# Patient Record
Sex: MALE | Race: WHITE | NOT HISPANIC OR LATINO | URBAN - METROPOLITAN AREA
[De-identification: names, ages, dates, MRNs, and addresses within clinical notes are randomized per-mention and may not be internally consistent; named-entity substitution may affect disease eponyms.]

---

## 2019-12-24 ENCOUNTER — WALK IN (OUTPATIENT)
Dept: URGENT CARE | Age: 74
End: 2019-12-24

## 2019-12-24 VITALS
TEMPERATURE: 97.9 F | SYSTOLIC BLOOD PRESSURE: 140 MMHG | RESPIRATION RATE: 16 BRPM | DIASTOLIC BLOOD PRESSURE: 70 MMHG | WEIGHT: 175 LBS | HEIGHT: 69 IN | HEART RATE: 70 BPM | BODY MASS INDEX: 25.92 KG/M2

## 2019-12-24 DIAGNOSIS — J01.90 ACUTE NON-RECURRENT SINUSITIS, UNSPECIFIED LOCATION: ICD-10-CM

## 2019-12-24 DIAGNOSIS — J01.90 ACUTE NON-RECURRENT SINUSITIS, UNSPECIFIED LOCATION: Primary | ICD-10-CM

## 2019-12-24 DIAGNOSIS — J06.9 VIRAL URI WITH COUGH: ICD-10-CM

## 2019-12-24 LAB
INTERNAL PROCEDURAL CONTROLS ACCEPTABLE: YES
S PYO AG THROAT QL IA.RAPID: NEGATIVE

## 2019-12-24 PROCEDURE — 87880 STREP A ASSAY W/OPTIC: CPT | Performed by: NURSE PRACTITIONER

## 2019-12-24 PROCEDURE — 99203 OFFICE O/P NEW LOW 30 MIN: CPT | Performed by: NURSE PRACTITIONER

## 2019-12-24 RX ORDER — AMOXICILLIN 875 MG/1
875 TABLET, COATED ORAL 2 TIMES DAILY
Qty: 20 TABLET | Refills: 0 | Status: SHIPPED | OUTPATIENT
Start: 2019-12-24 | End: 2020-01-03

## 2019-12-24 RX ORDER — FLUTICASONE PROPIONATE 50 MCG
2 SPRAY, SUSPENSION (ML) NASAL DAILY
Qty: 16 G | Refills: 1 | Status: SHIPPED | OUTPATIENT
Start: 2019-12-24 | End: 2019-12-31

## 2019-12-24 RX ORDER — FLECAINIDE ACETATE 100 MG/1
TABLET ORAL
Refills: 3 | COMMUNITY
Start: 2019-10-21

## 2019-12-24 RX ORDER — PRAVASTATIN SODIUM 20 MG
TABLET ORAL
Refills: 2 | COMMUNITY
Start: 2019-11-27

## 2019-12-24 RX ORDER — CELECOXIB 200 MG/1
CAPSULE ORAL
Refills: 10 | COMMUNITY
Start: 2019-11-29

## 2019-12-26 RX ORDER — FLUTICASONE PROPIONATE 50 MCG
SPRAY, SUSPENSION (ML) NASAL
Qty: 48 G | Refills: 0 | OUTPATIENT
Start: 2019-12-26

## 2019-12-26 ASSESSMENT — ENCOUNTER SYMPTOMS
FEVER: 0
DIAPHORESIS: 0
DIZZINESS: 0
SLEEP DISTURBANCE: 0
NAUSEA: 0
SINUS PRESSURE: 1
CHEST TIGHTNESS: 0
LIGHT-HEADEDNESS: 0
EYE REDNESS: 0
SORE THROAT: 1
HEADACHES: 1
ABDOMINAL PAIN: 0
CONSTITUTIONAL NEGATIVE: 1
SHORTNESS OF BREATH: 0
WHEEZING: 0
CHILLS: 0
SINUS PAIN: 0
VOMITING: 0
COUGH: 1

## 2021-03-20 ENCOUNTER — HOSPITAL ENCOUNTER (EMERGENCY)
Age: 76
End: 2021-03-20

## 2024-12-25 ENCOUNTER — HOSPITAL ENCOUNTER (OUTPATIENT)
Age: 79
Discharge: HOME | End: 2024-12-25
Payer: COMMERCIAL

## 2024-12-25 VITALS
RESPIRATION RATE: 18 BRPM | DIASTOLIC BLOOD PRESSURE: 65 MMHG | HEART RATE: 69 BPM | OXYGEN SATURATION: 100 % | SYSTOLIC BLOOD PRESSURE: 152 MMHG | TEMPERATURE: 98 F

## 2024-12-25 DIAGNOSIS — J10.1 INFLUENZA A: Primary | ICD-10-CM

## 2024-12-25 DIAGNOSIS — R53.83 FATIGUE: ICD-10-CM

## 2024-12-25 DIAGNOSIS — R05.1 ACUTE COUGH: ICD-10-CM

## 2024-12-25 LAB
POCT INFLUENZA A: POSITIVE
POCT INFLUENZA B: NEGATIVE
SARS-COV-2 RNA RESP QL NAA+PROBE: NOT DETECTED

## 2024-12-25 PROCEDURE — 99204 OFFICE O/P NEW MOD 45 MIN: CPT | Performed by: PHYSICIAN ASSISTANT

## 2024-12-25 PROCEDURE — U0002 COVID-19 LAB TEST NON-CDC: HCPCS | Performed by: PHYSICIAN ASSISTANT

## 2024-12-25 PROCEDURE — 87502 INFLUENZA DNA AMP PROBE: CPT | Performed by: PHYSICIAN ASSISTANT

## 2024-12-25 RX ORDER — PRAVASTATIN SODIUM 20 MG
1 TABLET ORAL DAILY
COMMUNITY
Start: 2023-09-15

## 2024-12-25 RX ORDER — BENZONATATE 100 MG/1
100 CAPSULE ORAL 3 TIMES DAILY PRN
Qty: 30 CAPSULE | Refills: 0 | Status: SHIPPED | OUTPATIENT
Start: 2024-12-25 | End: 2025-01-24

## 2024-12-25 RX ORDER — FLECAINIDE ACETATE 100 MG/1
TABLET ORAL
COMMUNITY
Start: 2023-10-19

## 2024-12-25 RX ORDER — ALBUTEROL SULFATE 90 UG/1
2 INHALANT RESPIRATORY (INHALATION) EVERY 4 HOURS PRN
Qty: 1 EACH | Refills: 0 | Status: SHIPPED | OUTPATIENT
Start: 2024-12-25

## 2024-12-25 NOTE — ED PROVIDER NOTES
Patient Seen in: [facility]      History   No chief complaint on file.    Stated Complaint: Throat issue    Subjective:   HPI    Patient is a 79-year-old  male with atrial fibrillation on Eliquis, hyperlipidemia, COVID by daughter, presenting to immediate care for evaluation of cold-like symptoms.  Onset: 3 days.  Patient experiencing generalized fatigue, malaise, nasal congestion, postnasal drip, sore throat, cough and congestion.  No fevers.  No chest pain or shortness of breath.  No weakness or confusion.  Not immunocompromise.  No known sick contacts.  Recently traveling to visit family.  Originally from [place].      Objective:     Past Medical History:    Atrial fibrillation (HCC)    Hyperlipidemia              Past Surgical History:   Procedure Laterality Date    Excis lumbar disk,one level      Hip replacement surgery Bilateral                 Social History     Socioeconomic History    Marital status:    Tobacco Use    Smoking status: Never     Passive exposure: Never    Smokeless tobacco: Never   Vaping Use    Vaping status: Never Used              Review of Systems   Constitutional:  Negative for fever.   HENT:  Positive for congestion and sore throat.    Respiratory:  Positive for cough. Negative for shortness of breath.    Cardiovascular:  Negative for chest pain.   Gastrointestinal:  Negative for abdominal pain, diarrhea and vomiting.   Musculoskeletal:  Negative for neck stiffness.   Skin:  Negative for rash.   Neurological:  Negative for dizziness, weakness and light-headedness.   Psychiatric/Behavioral:  Negative for confusion.    All other systems reviewed and are negative.      Positive for stated complaint: Throat issue  Other systems are as noted in HPI.  Constitutional and vital signs reviewed.      All other systems reviewed and negative except as noted above.    Physical Exam     ED Triage Vitals [12/25/24 1325]   /65   Pulse 69   Resp 18   Temp 97.5 °F (36.4  °C)   Temp src Oral   SpO2 100 %   O2 Device None (Room air)       Current Vitals:   Vital Signs  BP: 152/65  Pulse: 69  Resp: 18  Temp: 97.5 °F (36.4 °C)  Temp src: Oral    Oxygen Therapy  SpO2: 100 %  O2 Device: None (Room air)        Physical Exam  Vitals and nursing note reviewed.   Constitutional:       General: He is not in acute distress.     Appearance: Normal appearance. He is not ill-appearing, toxic-appearing or diaphoretic.   HENT:      Head: Normocephalic and atraumatic.      Right Ear: Tympanic membrane normal.      Left Ear: Tympanic membrane normal.      Nose: Congestion present. No rhinorrhea.      Mouth/Throat:      Mouth: Mucous membranes are moist.      Pharynx: No oropharyngeal exudate or posterior oropharyngeal erythema.   Eyes:      Conjunctiva/sclera: Conjunctivae normal.   Cardiovascular:      Rate and Rhythm: Normal rate and regular rhythm.      Pulses: Normal pulses.   Pulmonary:      Effort: Pulmonary effort is normal. No respiratory distress.      Breath sounds: Normal breath sounds. No wheezing, rhonchi or rales.   Musculoskeletal:         General: No tenderness.      Cervical back: Normal range of motion. No rigidity.   Skin:     Capillary Refill: Capillary refill takes less than 2 seconds.      Findings: No rash.   Neurological:      General: No focal deficit present.      Mental Status: He is alert and oriented to person, place, and time.   Psychiatric:         Mood and Affect: Mood normal.         Behavior: Behavior normal.           ED Course     Labs Reviewed   POCT FLU TEST - Abnormal; Notable for the following components:       Result Value    POCT INFLUENZA A Positive (*)     All other components within normal limits    Narrative:     This assay is a rapid molecular in vitro test utilizing nucleic acid amplification of influenza A and B viral RNA.   RAPID SARS-COV-2 BY PCR - Normal     Results for orders placed or performed during the hospital encounter of 12/25/24   POCT Flu  Test    Collection Time: 12/25/24  1:12 PM    Specimen: Nares; Other   Result Value Ref Range    POCT INFLUENZA A Positive (A) Negative    POCT INFLUENZA B Negative Negative   Rapid SARS-CoV-2 by PCR    Collection Time: 12/25/24  1:12 PM    Specimen: Nares; Other   Result Value Ref Range    Rapid SARS-CoV-2 by PCR Not Detected Not Detected          MDM     Dx: Influenza A, Initial Encounter  Onset: 3 days  Influenza A PCR positive  COVID testing negative  Overall well-appearing  Hemodynamically stable  Afebrile  Tolerating PO  Outpatient management  Supportive care  Rest  Oral hydration  Motrin/Tylenol as needed for pain/fever/myalgia  Out of Tamiflu benefit window  Tessalon for cough  Albuterol inhaler prn SOB, bronchospasms, wheezing  Droplet and Isolation Precautions   PCP follow  Return precaution  Discharge instructions Influenza        Medical Decision Making      Disposition and Plan     Clinical Impression:  1. Influenza A    2. Fatigue    3. Acute cough         Disposition:  Discharge  12/25/2024  1:42 pm    Follow-up:  No follow-up provider specified.        Medications Prescribed:  Current Discharge Medication List        START taking these medications    Details   albuterol 108 (90 Base) MCG/ACT Inhalation Aero Soln Inhale 2 puffs into the lungs every 4 (four) hours as needed for Wheezing or Shortness of Breath.  Qty: 1 each, Refills: 0      benzonatate 100 MG Oral Cap Take 1 capsule (100 mg total) by mouth 3 (three) times daily as needed for cough.  Qty: 30 capsule, Refills: 0                 Supplementary Documentation: